# Patient Record
Sex: MALE | Race: WHITE
[De-identification: names, ages, dates, MRNs, and addresses within clinical notes are randomized per-mention and may not be internally consistent; named-entity substitution may affect disease eponyms.]

---

## 2018-01-28 ENCOUNTER — HOSPITAL ENCOUNTER (INPATIENT)
Dept: HOSPITAL 92 - ERS | Age: 48
LOS: 5 days | Discharge: HOME HEALTH | DRG: 442 | End: 2018-02-02
Attending: FAMILY MEDICINE | Admitting: FAMILY MEDICINE
Payer: COMMERCIAL

## 2018-01-28 VITALS — BODY MASS INDEX: 33.4 KG/M2

## 2018-01-28 DIAGNOSIS — K75.0: Primary | ICD-10-CM

## 2018-01-28 DIAGNOSIS — I10: ICD-10-CM

## 2018-01-28 DIAGNOSIS — N17.9: ICD-10-CM

## 2018-01-28 DIAGNOSIS — R74.0: ICD-10-CM

## 2018-01-28 DIAGNOSIS — E88.09: ICD-10-CM

## 2018-01-28 DIAGNOSIS — J10.1: ICD-10-CM

## 2018-01-28 DIAGNOSIS — E86.0: ICD-10-CM

## 2018-01-28 DIAGNOSIS — K57.20: ICD-10-CM

## 2018-01-28 LAB
ALBUMIN SERPL BCG-MCNC: 3.5 G/DL (ref 3.5–5)
ALP SERPL-CCNC: 45 U/L (ref 40–150)
ALT SERPL W P-5'-P-CCNC: 51 U/L (ref 8–55)
ANION GAP SERPL CALC-SCNC: 16 MMOL/L (ref 10–20)
AST SERPL-CCNC: 32 U/L (ref 5–34)
BASOPHILS # BLD AUTO: 0.1 THOU/UL (ref 0–0.2)
BASOPHILS NFR BLD AUTO: 0.3 % (ref 0–1)
BILIRUB SERPL-MCNC: 0.7 MG/DL (ref 0.2–1.2)
BUN SERPL-MCNC: 13 MG/DL (ref 8.9–20.6)
CALCIUM SERPL-MCNC: 9.3 MG/DL (ref 7.8–10.44)
CHLORIDE SERPL-SCNC: 104 MMOL/L (ref 98–107)
CO2 SERPL-SCNC: 21 MMOL/L (ref 22–29)
CREAT CL PREDICTED SERPL C-G-VRATE: 0 ML/MIN (ref 70–130)
CRP SERPL-MCNC: 30.21 MG/DL
CRYSTAL-AUWI FLAG: 0 (ref 0–15)
EOSINOPHIL # BLD AUTO: 0 THOU/UL (ref 0–0.7)
EOSINOPHIL NFR BLD AUTO: 0.2 % (ref 0–10)
GLOBULIN SER CALC-MCNC: 4.3 G/DL (ref 2.4–3.5)
GLUCOSE SERPL-MCNC: 122 MG/DL (ref 70–105)
HEV IGM SER QL: 1.6 (ref 0–7.99)
HGB BLD-MCNC: 12.8 G/DL (ref 14–18)
HYALINE CASTS #/AREA URNS LPF: (no result) LPF
INR PPP: 1.2
LIPASE SERPL-CCNC: 24 U/L (ref 8–78)
LYMPHOCYTES # BLD: 1.4 THOU/UL (ref 1.2–3.4)
LYMPHOCYTES NFR BLD AUTO: 6.9 % (ref 21–51)
MCH RBC QN AUTO: 31.4 PG (ref 27–31)
MCV RBC AUTO: 95.3 FL (ref 80–94)
MONOCYTES # BLD AUTO: 2.3 THOU/UL (ref 0.11–0.59)
MONOCYTES NFR BLD AUTO: 11.8 % (ref 0–10)
NEUTROPHILS # BLD AUTO: 16 THOU/UL (ref 1.4–6.5)
NEUTROPHILS NFR BLD AUTO: 80.9 % (ref 42–75)
PATHC CAST-AUWI FLAG: 0.13 (ref 0–2.49)
PLATELET # BLD AUTO: 361 THOU/UL (ref 130–400)
POTASSIUM SERPL-SCNC: 3.6 MMOL/L (ref 3.5–5.1)
PROTHROMBIN TIME: 15.1 SEC (ref 12–14.7)
RBC # BLD AUTO: 4.08 MILL/UL (ref 4.7–6.1)
RBC UR QL AUTO: (no result) HPF (ref 0–3)
SODIUM SERPL-SCNC: 137 MMOL/L (ref 136–145)
SP GR UR STRIP: (no result) (ref 1–1.04)
SPERM-AUWI FLAG: 0 (ref 0–9.9)
WBC # BLD AUTO: 19.7 THOU/UL (ref 4.8–10.8)
WBC UR QL AUTO: (no result) HPF (ref 0–3)
YEAST-AUWI FLAG: 0 (ref 0–25)

## 2018-01-28 PROCEDURE — C1751 CATH, INF, PER/CENT/MIDLINE: HCPCS

## 2018-01-28 PROCEDURE — A4216 STERILE WATER/SALINE, 10 ML: HCPCS

## 2018-01-28 PROCEDURE — 99152 MOD SED SAME PHYS/QHP 5/>YRS: CPT

## 2018-01-28 PROCEDURE — 87804 INFLUENZA ASSAY W/OPTIC: CPT

## 2018-01-28 PROCEDURE — 74177 CT ABD & PELVIS W/CONTRAST: CPT

## 2018-01-28 PROCEDURE — 77002 NEEDLE LOCALIZATION BY XRAY: CPT

## 2018-01-28 PROCEDURE — 80202 ASSAY OF VANCOMYCIN: CPT

## 2018-01-28 PROCEDURE — 83690 ASSAY OF LIPASE: CPT

## 2018-01-28 PROCEDURE — 81015 MICROSCOPIC EXAM OF URINE: CPT

## 2018-01-28 PROCEDURE — 36415 COLL VENOUS BLD VENIPUNCTURE: CPT

## 2018-01-28 PROCEDURE — 83605 ASSAY OF LACTIC ACID: CPT

## 2018-01-28 PROCEDURE — 93970 EXTREMITY STUDY: CPT

## 2018-01-28 PROCEDURE — 85610 PROTHROMBIN TIME: CPT

## 2018-01-28 PROCEDURE — 87205 SMEAR GRAM STAIN: CPT

## 2018-01-28 PROCEDURE — 87040 BLOOD CULTURE FOR BACTERIA: CPT

## 2018-01-28 PROCEDURE — 82105 ALPHA-FETOPROTEIN SERUM: CPT

## 2018-01-28 PROCEDURE — 71046 X-RAY EXAM CHEST 2 VIEWS: CPT

## 2018-01-28 PROCEDURE — 81003 URINALYSIS AUTO W/O SCOPE: CPT

## 2018-01-28 PROCEDURE — 96365 THER/PROPH/DIAG IV INF INIT: CPT

## 2018-01-28 PROCEDURE — C1729 CATH, DRAINAGE: HCPCS

## 2018-01-28 PROCEDURE — 36569 INSJ PICC 5 YR+ W/O IMAGING: CPT

## 2018-01-28 PROCEDURE — 80053 COMPREHEN METABOLIC PANEL: CPT

## 2018-01-28 PROCEDURE — 82378 CARCINOEMBRYONIC ANTIGEN: CPT

## 2018-01-28 PROCEDURE — 85025 COMPLETE CBC W/AUTO DIFF WBC: CPT

## 2018-01-28 PROCEDURE — 87633 RESP VIRUS 12-25 TARGETS: CPT

## 2018-01-28 PROCEDURE — 87070 CULTURE OTHR SPECIMN AEROBIC: CPT

## 2018-01-28 PROCEDURE — 86140 C-REACTIVE PROTEIN: CPT

## 2018-01-28 PROCEDURE — 96361 HYDRATE IV INFUSION ADD-ON: CPT

## 2018-01-28 PROCEDURE — 86301 IMMUNOASSAY TUMOR CA 19-9: CPT

## 2018-01-28 PROCEDURE — 47010 HEPATOT OPN DRG ABSC/CST 1/2: CPT

## 2018-01-28 PROCEDURE — 99153 MOD SED SAME PHYS/QHP EA: CPT

## 2018-01-28 RX ADMIN — METRONIDAZOLE SCH MLS: 500 INJECTION, SOLUTION INTRAVENOUS at 18:24

## 2018-01-28 RX ADMIN — POTASSIUM CHLORIDE, DEXTROSE MONOHYDRATE AND SODIUM CHLORIDE SCH MLS: 150; 5; 450 INJECTION, SOLUTION INTRAVENOUS at 18:23

## 2018-01-28 NOTE — CT
CT ABDOMEN AND PELVIS WITH CONTRAST:

 

Date:  01/28/18 

 

HISTORY:  

Abdominal pain. 

 

COMPARISON:  

None. 

 

FINDINGS:

The lung bases are clear. No pericardial effusion. 

 

There is thickening of the sigmoid colon with some mild inflammation around the diverticulum. Within 
the left lobe of the liver, abutting the capsule, is a heterogeneous peripherally enhancing mass estela
uring 7.2 x 5.9 x 5.5 cm. There are multiple central hypodensities within this mass, which is irregul
ar heterogeneous. There is transhepatic attenuation difference demarcated with increased attenuation 
of the left lobe of the liver relative to the right. There are multiple smaller hypodense lesions in 
the right lobe of the liver, hepatic segment 7, measuring 1.2 and 0.7 cm. 

 

Small retroperitoneal lymph nodes. The spleen and pancreas are unremarkable. The hepatic mass displac
es the middle and left hepatic veins and is interposed between the two. 

 

Skeleton unremarkable. 

 

IMPRESSION: 

1.  Heterogeneous hypodense mass in hepatic segment 2, abutting the capsule, interposed between the l
eft and middle hepatic veins, measuring 7.2 x 5.9 x 5.5 cm. This has the appearance of hepatic absces
s. There are multiple smaller hypodense lesions within hepatic segment 8, as well as segment 7. There
 may be some other smaller abscesses. Metastatic disease is also a possibility. There is also a separ
ate low attenuation mass within segment 4b measuring 2.3 cm. 

 

2.  Thickening of the sigmoid colon with inflammation around diverticulum, mild. This may represent r
esolving diverticulitis. A colonoscopic evaluation will be necessary after resolution of symptomatolo
gy to evaluate for underlying mass. 

 

 

 

POS: PRABHA

## 2018-01-28 NOTE — HP
PRIMARY CARE PHYSICIAN:  Dr. Refugio Marie.

 

CHIEF COMPLAINT:  Fevers and body aches.

 

HISTORY OF PRESENT ILLNESS:  The patient established with me in the clinic on 01/10/2018 with flu-lik
e symptoms.  Flu swab and strep throat were negative; however, patient did have otitis media present,
 was treated with cefdinir with interval clearing of symptoms and fevers.  Upon cessation of antibiot
ics, generalized malaise and fevers returned, presented back to clinic.  Flu swab repeat was negative
 as well.  The patient instructed to call back if fevers continued and he did so last week when he no
ticed a stool consistency and changed to looser with mucus.  The patient with a known history of dive
rticulitis in the past; however, the patient has not had any abdomen pain in the last 2-3 weeks or st
ool changes prior to several days ago; started on ciprofloxacin and metronidazole over the last 48 ho
urs; however, his fevers continued and he presented to the Emergency Department for evaluation and fo
und to have a liver abscess on CT scan.  On review of records in our clinic systems, the patient was 
seen in 2008 for abdomen pain at the [facility], established with Dr. Krause tem
porarily, was sent to Dr. Perdomo of Gastroenterology who recommended colonoscopy; however, no records 
of colonoscopy performed.  The patient states his last colonoscopy was 2 years ago with [facility] physicians after he changed insurances.  CT scan of 2008 [facility] reviewed w
ithout abnormality and no diverticulitis.  Liver was normal.

 

On formal review of systems, fevers and chills, positive malaise, body aches, changes in stool consis
tency.  No nausea, vomiting.  No constipation.  No abdomen pain.  Positive for cough and congestion. 
 No lower extremity edema.  No palpitations, chest pain.  No rashes.

 

On review of the past family, medical and surgical history includes colonoscopy 2 years ago prior chelsea
veda for injury.

 

ALLERGIES:  No known drug allergies.

 

SOCIAL HISTORY:  Lives with spouse and with children.  No significant tobacco or alcohol use reported
.

 

PHYSICAL EXAMINATION:

VITAL SIGNS:  Review on arrival to the floor, temperature of 98.1, pulse of 91, respiratory rate of 1
6, oxygen saturation 98% on room air and blood pressure 146/78.

GENERAL:  The patient is alert and oriented, in no acute distress.

HEENT:  Normocephalic and atraumatic.  Extraocular movements are intact.  Sclerae are clear.  Oral mu
[place]a is dry.

NECK:  Supple.

HEART:  Regular rate and rhythm.  No murmurs auscultated.

LUNGS:  Clear to auscultation bilaterally.  No rubs or wheezes.

ABDOMEN:  Protuberant, resonant to percussion, nontender.  Kincaid's sign negative.  No rebound.  Nega
tive fluid wave.  No CVA tenderness.

EXTREMITIES:  No lower extremity edema.

NEUROLOGIC:  The patient is alert and oriented x3, no focal deficits.  Speech is normal.

 

REVIEW OF LABORATORY WORK:  White blood cell count of 19.7, hemoglobin of 12.8, platelet count of 361
, neutrophils percentage 80.9 with 16% bands.  INR of 1.2.  Lactic acid of 4.3, CO2 21.  Sodium 137, 
potassium of 3.6, creatinine of 1.1, glucose of 122, calcium of 9.3, AST of 32, ALT of 51, CRP of 30,
 alkaline phosphatase of 45, lipase of 24.  Albumin 3.5.  Urinalysis clear, large blood with too nume
garry to count rbc's microscopy.  No white blood cells, no leukocyte esterase.  Specific gravity of 1.
06.

 

IMAGING DATA:  Abdomen CT reviewed heterogeneous hypodense mass and hepatic segment to measuring 7.2 
x 5.9 x 5.5 cm consistent with hepatic abscess, multiple small hypodense lesions scattered throughout
 segment 87 taking the sigmoid colon consistent with diverticulum and inflamed recommending a colonos
copy to rule out any underlying masses per Radiology.

 

ASSESSMENT:  A liver abscess diverticulitis, dehydration.

 

PLAN:  Emergency Department contacted Surgery for recommendations if patient was a candidate for any 
acute interventions.  They recommended a CT liver abscess drained per Interventional Radiology, which
 is ordered and pending patient's n.p.o. at midnight.  

Consulting Gastroenterology for secondary opinion; start patient on broad-spectrum IV antibiotics, va
ncomycin and Zosyn, which was started in the Emergency Department for continuation of metronidazole, 
would expect any fluid cultures, fluid produced from liver to be cultured and will adjust antibiotics
 as needed.  Dehydration, start the patient on fluid bolus.  We will start with additional IV fluids 
while patient is n.p.o. overnight for vital signs.  The patient is not currently septic.  We will fol
low the patient's fever profile, vital signs, cell counts and liver enzymes while inpatient.

## 2018-01-28 NOTE — CON
DATE FOR CONSULTATION:  01/28/2018

 

REQUESTING PHYSICIAN:  Dr. Marie.

 

REASON FOR CONSULTATION:  Liver lesion likely abscess

 

HISTORY OF PRESENT ILLNESS:  Nasir Mckeon is a very pleasant 47-year-old man who has a past medical
 history of diverticulitis attacks, a couple of mild attacks since 2015.  He says his last colonoscop
y was a couple of years ago at [facility] with the only finding being diverticular disease.  No 
polyps removed.  I do have that report.  He had previously seen my GI colleague, Dr. Freeman Perdomo ab
out 9 years ago for some abdominal pain symptoms at that time, but had not followed up with Dr. Perdomo
 since then.  At any rate, Mr. Mckeon really has no chronic gastrointestinal symptoms.  About 3 wee
ks ago, he started having malaise and flu-like symptoms.  He was treated for otitis media and felt be
tter for a while, but now for the past week, he has had recurrent fevers which have been quite signif
icant despite alternating Tylenol and ibuprofen at home.  Interestingly, he reports no abdominal pain
 throughout all of this and really no change in his normal bowel habits, certainly no hematochezia or
 diarrhea.  His weight has been stable, but his appetite has been declining significantly.  He underw
ent a CT scan of the abdomen and pelvis earlier today and this demonstrated a 7.2 x 5.9 x 5.5 cm hete
rogeneous density in the left hepatic lobe and multiple smaller lesions in the right lobe, most likel
y consistent with abscess versus less likely metastatic disease.  There is also an area of the sigmoi
d colon with stranding consistent with diverticulitis.  He has a leukocytosis and elevated CRP, note 
LFTs are normal.  He is febrile, but hemodynamically stable.  He was started on IV vancomycin, Zosyn 
and metronidazole.  CT guided abscess drainage is planned for tomorrow with Interventional Radiology.
  Blood cultures are pending.  Note the patient has no underlying history of chronic liver disease.  
Alcohol use is rare.  He does not use injection drugs.

 

REVIEW OF SYSTEMS:  Full review of systems including constitutional, head, eyes, ears, nose, throat, 
GI, , cardiovascular, respiratory, musculoskeletal, and neurologic systems is negative except as no
geneva in the HPI.

 

PAST MEDICAL HISTORY:  Diverticulitis in 2015.  Colonoscopy in 2015, showing diverticulosis.

 

ALLERGIES:  No known drug allergies.

 

OUTPATIENT MEDICATIONS:  None.

 

INPATIENT MEDICATIONS:  IV vancomycin, IV Zosyn, IV metronidazole.

 

SOCIAL HISTORY:  No smoking or alcohol use.

 

FAMILY HISTORY:  Negative for GI or liver disease.

 

PHYSICAL EXAMINATION:

VITAL SIGNS:  Temperature 100.6, pulse 91, blood pressure 146/78, 98% oxygen saturation on room air.

GENERAL:  A 47-year-old  man, lying in bed comfortably, in no distress.

SKIN:  No rash.  No jaundice.

EYES:  No scleral icterus.  Extraocular movements intact.

ENT:  Mucous membranes moist, no oral lesions.

LYMPH:  No submandibular, supraclavicular lymphadenopathy.

THYROID:  Nontender to palpation.

HEART:  Regular rate and rhythm.

LUNGS:  Clear to auscultation bilaterally.

ABDOMEN:  Bowel sounds are present, soft, mild tenderness to deep palpation in the lower abdomen, oth
erwise no tenderness to the abdomen.  No guarding or rebound tenderness.  No palpable organomegaly ap
preciated.

EXTREMITIES:  No peripheral edema.

VESSELS:  Radial pulses 2+ bilaterally.

NEUROLOGICAL:  Cranial nerves II-XII intact bilaterally.  No focal deficits.

 

LABORATORY STUDIES:  WBC elevated at 19.7, CRP elevated at 30.21, hemoglobin 12.8, platelets 361.  So
dium 137, potassium 3.6, BUN 13, creatinine 1.11, glucose 122, lipase 24.  LFTs all normal with total
 bilirubin 0.7, alkaline phosphatase 45, AST 32, ALT 51, albumin 3.5.  Blood cultures are pending.

 

IMAGING STUDIES:  CT of the abdomen and pelvis demonstrates sigmoid diverticulitis and a 7.2 x 5.9 x 
5.5 cm left hepatic lobe density which is heterogenous and likely represent an abscess.  There are mu
ltiple smaller lesions in the right hepatic lobe, possibly representing smaller abscesses versus less
 likely metastatic disease.

 

ASSESSMENT AND PLAN:

1.  Liver abscess.

2.  Diverticulitis, sigmoid colon, minimally symptomatic.

 

I have reviewed the films as well as the CT report.  Overall, I agree that this likely does represent
s a liver abscess rather than malignancy.  It is unclear, if this is related to his diverticulitis, w
adriano is actually very minimally symptomatic.  We will order tumor markers including AFP, CA-19-9 and 
CEA to be drawn with his morning labs, but I expect those will be within normal limits.  I do agree w
ith the plan already in place for Interventional Radiology to aspirate and likely insert a drain into
 this abscess cavity tomorrow.  Follow up all cultures including aspirate cultures and blood cultures
.  Continue with the antibiotics for now.  I do not recommend any colonoscopy at this time acutely, t
landry he will need a followup colon exam several weeks after resolution.

 

Dr. Perdomo is back tomorrow.

 

Thank you for the consultation.  Please call back with questions or concerns.

## 2018-01-29 LAB
ALBUMIN SERPL BCG-MCNC: 2.9 G/DL (ref 3.5–5)
ALP SERPL-CCNC: 44 U/L (ref 40–150)
ALT SERPL W P-5'-P-CCNC: 46 U/L (ref 8–55)
ANION GAP SERPL CALC-SCNC: 11 MMOL/L (ref 10–20)
AST SERPL-CCNC: 29 U/L (ref 5–34)
BASOPHILS # BLD AUTO: 0 THOU/UL (ref 0–0.2)
BASOPHILS NFR BLD AUTO: 0 % (ref 0–1)
BILIRUB SERPL-MCNC: 0.5 MG/DL (ref 0.2–1.2)
BUN SERPL-MCNC: 9 MG/DL (ref 8.9–20.6)
CALCIUM SERPL-MCNC: 8.3 MG/DL (ref 7.8–10.44)
CHLORIDE SERPL-SCNC: 107 MMOL/L (ref 98–107)
CO2 SERPL-SCNC: 25 MMOL/L (ref 22–29)
CREAT CL PREDICTED SERPL C-G-VRATE: 139 ML/MIN (ref 70–130)
EOSINOPHIL # BLD AUTO: 0.2 THOU/UL (ref 0–0.7)
EOSINOPHIL NFR BLD AUTO: 1.2 % (ref 0–10)
GLOBULIN SER CALC-MCNC: 3.5 G/DL (ref 2.4–3.5)
GLUCOSE SERPL-MCNC: 110 MG/DL (ref 70–105)
HGB BLD-MCNC: 11.4 G/DL (ref 14–18)
LIPASE SERPL-CCNC: 18 U/L (ref 8–78)
LYMPHOCYTES # BLD: 1.1 THOU/UL (ref 1.2–3.4)
LYMPHOCYTES NFR BLD AUTO: 8.2 % (ref 21–51)
MCH RBC QN AUTO: 31.5 PG (ref 27–31)
MCV RBC AUTO: 97.7 FL (ref 80–94)
MONOCYTES # BLD AUTO: 1.6 THOU/UL (ref 0.11–0.59)
MONOCYTES NFR BLD AUTO: 12.8 % (ref 0–10)
NEUTROPHILS # BLD AUTO: 10 THOU/UL (ref 1.4–6.5)
NEUTROPHILS NFR BLD AUTO: 77.8 % (ref 42–75)
PLATELET # BLD AUTO: 290 THOU/UL (ref 130–400)
POTASSIUM SERPL-SCNC: 3.9 MMOL/L (ref 3.5–5.1)
RBC # BLD AUTO: 3.63 MILL/UL (ref 4.7–6.1)
SODIUM SERPL-SCNC: 139 MMOL/L (ref 136–145)
WBC # BLD AUTO: 12.8 THOU/UL (ref 4.8–10.8)

## 2018-01-29 PROCEDURE — 0F903ZX DRAINAGE OF LIVER, PERCUTANEOUS APPROACH, DIAGNOSTIC: ICD-10-PCS | Performed by: RADIOLOGY

## 2018-01-29 RX ADMIN — POTASSIUM CHLORIDE, DEXTROSE MONOHYDRATE AND SODIUM CHLORIDE SCH MLS: 150; 5; 450 INJECTION, SOLUTION INTRAVENOUS at 00:28

## 2018-01-29 RX ADMIN — METRONIDAZOLE SCH MLS: 500 INJECTION, SOLUTION INTRAVENOUS at 15:00

## 2018-01-29 RX ADMIN — POTASSIUM CHLORIDE, DEXTROSE MONOHYDRATE AND SODIUM CHLORIDE SCH MLS: 150; 5; 450 INJECTION, SOLUTION INTRAVENOUS at 18:32

## 2018-01-29 RX ADMIN — POTASSIUM CHLORIDE, DEXTROSE MONOHYDRATE AND SODIUM CHLORIDE SCH MLS: 150; 5; 450 INJECTION, SOLUTION INTRAVENOUS at 15:56

## 2018-01-29 RX ADMIN — VANCOMYCIN HYDROCHLORIDE SCH MLS: 1 INJECTION, POWDER, LYOPHILIZED, FOR SOLUTION INTRAVENOUS at 02:16

## 2018-01-29 RX ADMIN — GUAIFENESIN AND DEXTROMETHORPHAN PRN ML: 100; 10 SYRUP ORAL at 18:31

## 2018-01-29 RX ADMIN — POTASSIUM CHLORIDE, DEXTROSE MONOHYDRATE AND SODIUM CHLORIDE SCH: 150; 5; 450 INJECTION, SOLUTION INTRAVENOUS at 12:39

## 2018-01-29 RX ADMIN — VANCOMYCIN HYDROCHLORIDE SCH MLS: 1 INJECTION, POWDER, LYOPHILIZED, FOR SOLUTION INTRAVENOUS at 15:56

## 2018-01-29 RX ADMIN — METRONIDAZOLE SCH MLS: 500 INJECTION, SOLUTION INTRAVENOUS at 02:16

## 2018-01-29 NOTE — CT
CT GUIDED PERCUTANEOUS DRAINAGE OF THE LIVER:

 

Date:  01/29/18 

 

CLINICAL HISTORY:  

Hepatic abscess. 

 

PROCEDURE:

Informed consent was obtained. The patient was escorted the procedural suite and  imaging of the
 abdomen was acquired. The lesion of interest was located. The patient's ventral abdomen was then mar
ked, and prepped and draped in the standard sterile fashion. Conscious sedation was provided by the 
adiology nurse and the patient was monitored throughout the procedure in stable condition. Topical an
esthesia was achieved with 1% lidocaine. Using a percutaneous 13.8 cm needle, the lesion of interest 
was approached. The needle did reach the periphery/anterior edge of the lesion. A guidewire was advan
marianna after inner stylette of the needle was removed. The guidewire did not freely coil within the pock
et. Various attempts to advance the guidewire did not result in coiling of the wire within the cavity
. Therefore, the guidewire was removed, and attempt for aspiration was performed. Two separate aspira
tions were then performed with the 13.8 cm needle, longest needle available. Scan, sanguinous aspirat
e was achieved into two separate sterile containers which were sent to laboratory for analysis. 

 

All devices were then removed from the patient. The patient tolerated the procedure well and without 
evidence of complication. 

 

IMPRESSION: 

CT guided percutaneous aspiration of hepatic abscess, as above. The deep, central location of the les
ion was beyond the accessible length of the longest available drainage kit, as discussed above. 

 

Procedural results called to patient's physician, Refugio Marie, at the time of dictation. 

 

CODE CR. 

 

 

 

POS: DENISSE

## 2018-01-29 NOTE — PRG
DATE OF SERVICE:  01/29/2018

 

SUBJECTIVE:  He underwent CT guided drainage of the liver abscess today.  The needle aspiration of th
e abscess was performed from the edge of the abscess; however, the guidewire could not be placed deep
ly into the abscess and a drain was not able to be placed.  The patient has no acute complaints at th
is time, otherwise.

 

OBJECTIVE:

VITAL SIGNS:  Temperature 98.4, pulse 77, and blood pressure 160/74.

GENERAL:  He is in no acute distress.  He is awake and alert.

CHEST:  Lungs are clear to auscultation bilaterally.

ABDOMEN:  Soft, nontender, and nondistended.

 

LABORATORY DATA:  White blood cell count 12.8, hemoglobin 11.4, and platelets 290.

 

IMPRESSION:  Liver abscess apparently from diverticulitis.  He had a colonoscopy 2 years ago at [facility] by Dr. Chisholm, which was otherwise negative.

 

RECOMMENDATIONS:

1.  Plan is for PICC line placement and IV antibiotics per Dr. Loaiza.

2.  Followup imaging and duration of antibiotics again will be determined by Dr. Loaiza.

3.  We will advance his diet.

4.  Follow up in GI clinic in 6 weeks.

5.  I will sign off for now.  Please call if GI can be of assistance.

## 2018-01-29 NOTE — CON
DATE OF CONSULTATION:  01/29/2018

 

REASON FOR CONSULTATION:  Liver abscess, diverticulitis.

 

HISTORY OF PRESENT ILLNESS:  A 47-year-old with history of diverticulitis, treated with oral antimicr
obials in the past, who, a few days ago, had diagnosed otitis media, treated with cefdinir with impro
vement, but then recrudescence of malaise and fevers with what he describes as a sensation of burning
 or heat in the left side of the abdomen.  He also noticed somewhat looser stool with mucus.  He then
 had a recrudescence of fever and presented to the emergency room, and a CT scan demonstrated a liver
 abscess and so he is admitted now for management.  The patient is heading for percutaneous aspirate,
 CT-guided.  Awake and alert.  No headaches, visual symptoms, sore throat, odynophagia, dysphagia, no
 toothache or back pain, no neck pain, no dyspnea, cough or sputum production.  No genitourinary symp
toms or joint symptoms.  No neurological symptoms.

 

PAST MEDICAL HISTORY:  Diverticulitis, treated conservatively in the past.  Last CT scan in 2008 did 
not show any inflammatory changes reportedly.

 

ALLERGIES:  None.

 

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

 

CURRENT MEDICATIONS:  Tylenol, Tessalon, Benadryl, Pepcid, Flagyl, Zosyn, vancomycin.

 

PHYSICAL EXAMINATION:

VITAL SIGNS:  T-max 101.8, blood pressure 140/70, pulse 83, respirations 20, O2 sat 95%.

GENERAL:  Appears in no distress.

SKIN EXAM:  Normal.  Peripheral IV access.  No Fajardo catheter.  No lymphadenopathy.

HEENT:  Ocular movements are conjugate.  Sclerae white.  Oral cavity normal.

NECK:  Supple.

LUNGS:  With symmetric clear breath sounds.

HEART:  S1 and S2, regular rate.  No tenderness.  No ascites.  No bladder distention.

:  No genital abnormalities.

EXTREMITIES:  Pulses are excellent in lower extremities.

NEUROLOGIC EXAMINATION:  Nonfocal.  Cognitive function normal.

 

LABORATORY DATA:  White cell count 19,000 down to 12.8, hemoglobin 11, platelets 290 with 77% neutrop
hils.  Sodium 139, creatinine 1.0.  Albumin down from 3.5 to 2.9.  Lipase 18.  Carcinoembryonic antig
en 0.74, tumor marker AFP was less than 2.  Urinalysis with 0-3 wbc's.  Two sets of blood cultures pe
nding at this time.  Abdomen and pelvis CT from yesterday, heterogeneous hypodense mass, hepatic segm
ent to abutting the capsule, which has the appearance of a hepatic abscess.  There are multiple small
er hypodense lesions also seen elsewhere, thickening of the sigmoid colon with inflammatory changes a
round diverticulum, mild.

 

ASSESSMENT:  Diverticulitis, now with a liver abscess, which probably is a consequence of spread from
 the venous system and portal vein towards the liver.  The usual pathogens including gram-negative ro
ds, anaerobes, Streptococci are the main considerations.  We will continue the current regimen and wa
it for the aspirate with cultures, PICC line placement, and protracted IV antimicrobial therapy admin
istration.  The patient probably will have a drain placed and we will keep the drain in until there i
s significant decrease in size of the abscess, most likely to be removed in the outpatient setting.

## 2018-01-29 NOTE — PRG
DATE OF SERVICE:  01/29/2018

 

HISTORY OF PRESENT ILLNESS:  The patient reports he continues to have cough, has only been going on f
or 2 to 3 days now.  The patient's daughter was diagnosed with flu in clinic today.  Flu swab of the 
patient was resulted as negative.  The patient continues to have intermittent fevers treated with Tyl
enol and states he is still feeling fatigued, slightly worse with initiation of the antibiotics in pr
evious outpatient.  He states he has good urination without difficulty on IV fluids.  When for liver 
drainage abscess, he had a CT with central location of abscess, radiology unable to aspirate drain si
te, aspiration was attempted at LINQ apparatus and sent for culture.

 

OBJECTIVE:

VITAL SIGNS:  Temperature of 100.3, pulse of 77, respiratory rate of 20, oxygen saturation of 99% on 
room air, and blood pressure 160/94.

GENERAL:  The patient is alert and oriented, in no acute distress.

HEENT:  Head is normocephalic, atraumatic.  Extraocular movements intact.  Sclerae are clear.  Oral m
ucosa is moist.

NECK:  Supple.

HEART:  Regular rate and rhythm.  No murmurs auscultated at the time of exam.

LUNGS:  Clear to auscultation bilaterally.

ABDOMEN:  Soft, nontender, positive bowel sounds throughout.

EXTREMITIES:  Lower extremities without cyanosis or edema.

NEUROLOGIC:  The patient is alert and oriented x3, no focal deficits.

 

LABORATORY DATA:  White blood cell count improved to 12.8, hemoglobin of 11.4, platelets of 290, neut
rophils slightly improved to 77.8.  Tumor marker AFP less than 2, CEA of 0.74, lipase 18, albumin of 
3.5, AST of 29, ALT of 46, glucose of 110, creatinine of 1.0.  Potassium of 3.9, sodium of 139, and c
hloride of 107.

 

ASSESSMENT AND PLAN:  Liver abscess, diverticulitis, gastroesophageal reflux disease, cough.  Continu
ing cough coverage.  No influenza as above.  Lungs are clear.  Infectious Disease consulted, recommen
ding PICC line for prolonged IV antibiotics.  We will follow their recommendations.  Follow up michael truong is pending.  The patient reports he is stable on Pepcid at this point in time regarding gastroesoph
ageal reflux disease.  P.r.n. cough medicines ordered.

## 2018-01-30 LAB
ALBUMIN SERPL BCG-MCNC: 2.7 G/DL (ref 3.5–5)
ALP SERPL-CCNC: 39 U/L (ref 40–150)
ALT SERPL W P-5'-P-CCNC: 39 U/L (ref 8–55)
ANION GAP SERPL CALC-SCNC: 10 MMOL/L (ref 10–20)
AST SERPL-CCNC: 24 U/L (ref 5–34)
BASOPHILS # BLD AUTO: 0 THOU/UL (ref 0–0.2)
BASOPHILS NFR BLD AUTO: 0.1 % (ref 0–1)
BILIRUB SERPL-MCNC: 0.5 MG/DL (ref 0.2–1.2)
BUN SERPL-MCNC: 6 MG/DL (ref 8.9–20.6)
CALCIUM SERPL-MCNC: 8.1 MG/DL (ref 7.8–10.44)
CHLORIDE SERPL-SCNC: 106 MMOL/L (ref 98–107)
CO2 SERPL-SCNC: 23 MMOL/L (ref 22–29)
CREAT CL PREDICTED SERPL C-G-VRATE: 152 ML/MIN (ref 70–130)
EOSINOPHIL # BLD AUTO: 0.2 THOU/UL (ref 0–0.7)
EOSINOPHIL NFR BLD AUTO: 1.5 % (ref 0–10)
GLOBULIN SER CALC-MCNC: 3.3 G/DL (ref 2.4–3.5)
GLUCOSE SERPL-MCNC: 110 MG/DL (ref 70–105)
HGB BLD-MCNC: 10.6 G/DL (ref 14–18)
LIPASE SERPL-CCNC: 18 U/L (ref 8–78)
LYMPHOCYTES # BLD: 1.4 THOU/UL (ref 1.2–3.4)
LYMPHOCYTES NFR BLD AUTO: 10.8 % (ref 21–51)
MCH RBC QN AUTO: 31.6 PG (ref 27–31)
MCV RBC AUTO: 96.6 FL (ref 80–94)
MONOCYTES # BLD AUTO: 1.4 THOU/UL (ref 0.11–0.59)
MONOCYTES NFR BLD AUTO: 10.7 % (ref 0–10)
NEUTROPHILS # BLD AUTO: 9.7 THOU/UL (ref 1.4–6.5)
NEUTROPHILS NFR BLD AUTO: 76.9 % (ref 42–75)
PLATELET # BLD AUTO: 278 THOU/UL (ref 130–400)
POTASSIUM SERPL-SCNC: 3.8 MMOL/L (ref 3.5–5.1)
RBC # BLD AUTO: 3.35 MILL/UL (ref 4.7–6.1)
SODIUM SERPL-SCNC: 135 MMOL/L (ref 136–145)
VANCOMYCIN TROUGH SERPL-MCNC: 10.9 UG/ML
WBC # BLD AUTO: 12.6 THOU/UL (ref 4.8–10.8)

## 2018-01-30 RX ADMIN — POTASSIUM CHLORIDE, DEXTROSE MONOHYDRATE AND SODIUM CHLORIDE SCH MLS: 150; 5; 450 INJECTION, SOLUTION INTRAVENOUS at 01:49

## 2018-01-30 RX ADMIN — MEROPENEM SCH MLS: 1 INJECTION, POWDER, FOR SOLUTION INTRAVENOUS at 21:41

## 2018-01-30 RX ADMIN — GUAIFENESIN AND DEXTROMETHORPHAN PRN ML: 100; 10 SYRUP ORAL at 17:47

## 2018-01-30 RX ADMIN — METRONIDAZOLE SCH MLS: 500 INJECTION, SOLUTION INTRAVENOUS at 14:12

## 2018-01-30 RX ADMIN — Medication PRN ML: at 21:41

## 2018-01-30 RX ADMIN — GUAIFENESIN AND DEXTROMETHORPHAN PRN ML: 100; 10 SYRUP ORAL at 08:40

## 2018-01-30 RX ADMIN — METRONIDAZOLE SCH MLS: 500 INJECTION, SOLUTION INTRAVENOUS at 01:49

## 2018-01-30 RX ADMIN — GUAIFENESIN AND DEXTROMETHORPHAN PRN ML: 100; 10 SYRUP ORAL at 00:07

## 2018-01-30 NOTE — PRG
DATE OF SERVICE:  01/30/2018

 

SUBJECTIVE:  The liver abscess percutaneous aspirate procedure was not successful or at least partial
ly successful.  The area of the abscess was beyond the reach of the catheter and the guidewire could 
not be threaded all the way to the abscess cavity.  A little bit of serosanguineous fluid was aspirat
ed and submitted for cultures.  The patient has developed a cough, which is short of paroxysmal, last
s for a few minutes and then resolves.  No headaches.  No abdominal pain.  No diarrhea.  No genitouri
nary symptoms.

 

OBJECTIVE:

VITAL SIGNS:  T-max 101.6, blood pressure 160/82, pulse 81, respirations 18-20.

GENERAL:  Awake and alert.  Coughing intermittently.

LUNGS:  With clear breath sounds.

HEART:  S1, S2, regular rate.

ABDOMEN:  Soft, not distended or tender.

 

LABORATORY:  White cell count 12.6, hemoglobin 10.6, platelets 278.  Sodium 135, creatinine 0.92.  Li
haven profile normal.  Bacterial culture pending.  Blood cultures negative thus far.

 

ASSESSMENT:  Liver abscess, diverticulitis, now with cough.  The patient does have evidence of poster
ior nasal drip to this could be related to chronic rhinosinusitis.  We will switch him to meropenem f
rom the current antimicrobials.  The usual pathogens involved in the liver abscess should respond wel
l to meropenem.  The amoebic liver abscess is unlikely.  PICC line placement is not ordered yet.

## 2018-01-30 NOTE — PRG
DATE OF SERVICE:  01/30/2018

 

HISTORY OF PRESENT ILLNESS:  The patient states he broke into a cold sweat this morning which is the 
first time he feels like his fever has truly broken since it occurred.  He continued on Tylenol episo
dically for this.  He still reports fatigue.  Still reports cough, no phlegm production.  No abdomen 
pain.  He is still urinating and having bowel movements without difficulty.

 

VITAL SIGNS:  Temperature of 100.3 yesterday evening, a.m. temperature of 98.7, pulse of 68, respirat
ory rate of 18, oxygen saturation 100% on room air, blood pressure 152/90.

 

LABORATORY DATA:  White blood cell count improved to 12.6, hemoglobin at 10.6, status post attempted 
liver aspiration, platelet count 278, % neutrophils 76.9, slightly improved, albumin of 2.7.  Lipase 
of 18, AST of 24, ALT of 39,  total bilirubin 0.5, glucose 110, creatinine 0.9, CO2 23, sodium 135, p
otassium 3.8, vancomycin trough of 10.9.  Blood cultures x2 currently negative.  Influenza negative. 
  Gram stain, no organisms on attempted liver aspirate.

 

PHYSICAL EXAMINATION: 

GENERAL:  The patient is alert and oriented, in no acute distress.

HEENT:  Normocephalic, atraumatic.  Extraocular movements are intact.  Sclerae are clear.  Oral mucos
a is moist.

NECK:  Supple.

HEART:  Regular rate and rhythm at time of exam.  No murmurs auscultated.

LUNGS:  Clear to auscultation bilaterally.

ABDOMEN:  Soft, nontender, positive bowel sounds throughout.  No rebound or guarding.

EXTREMITIES:  Lower extremities without cyanosis or edema.

NEUROLOGIC:  The patient is alert and oriented x3, no focal deficits.  Speech is normal.

 

ASSESSMENT AND PLAN:  Liver abscess, diverticulitis.   Gastroenterology signed off, recommended shaun
nuation of plan for PICC line and long-term IV antibiotics.  We will follow up on cultures and Infect
ious Disease recommendations for outpatient antibiotics.  The patient will likely remain inpatient fo
r the next couple of days while cultures are being read out why he continues to have fevers.  We will
 continue the Tylenol, will likely cover with low dose amlodipine regarding the patient's blood press
ure elevation and hypertension, it may be stress induced reaction, but we will continue to follow.

## 2018-01-30 NOTE — RAD
PA AND LATERAL CHEST:

1/30/18

 

HISTORY: 

Cough.

 

Heart size appears slightly enlarged. Mediastinal structures are unremarkable. The lungs are clear of
 infiltrates.

 

IMPRESSION:  

Minimal cardiomegaly. 

 

POS: [facility]

## 2018-01-31 LAB
ALBUMIN SERPL BCG-MCNC: 2.9 G/DL (ref 3.5–5)
ALP SERPL-CCNC: 38 U/L (ref 40–150)
ALT SERPL W P-5'-P-CCNC: 48 U/L (ref 8–55)
ANION GAP SERPL CALC-SCNC: 12 MMOL/L (ref 10–20)
AST SERPL-CCNC: 37 U/L (ref 5–34)
BILIRUB SERPL-MCNC: 0.3 MG/DL (ref 0.2–1.2)
BUN SERPL-MCNC: 8 MG/DL (ref 8.9–20.6)
CALCIUM SERPL-MCNC: 8.5 MG/DL (ref 7.8–10.44)
CHLORIDE SERPL-SCNC: 106 MMOL/L (ref 98–107)
CO2 SERPL-SCNC: 24 MMOL/L (ref 22–29)
CREAT CL PREDICTED SERPL C-G-VRATE: 120 ML/MIN (ref 70–130)
GLOBULIN SER CALC-MCNC: 3.4 G/DL (ref 2.4–3.5)
GLUCOSE SERPL-MCNC: 102 MG/DL (ref 70–105)
HGB BLD-MCNC: 11 G/DL (ref 14–18)
MACROCYTES BLD QL SMEAR: (no result) (100X)
MCH RBC QN AUTO: 31.8 PG (ref 27–31)
MCV RBC AUTO: 96.4 FL (ref 80–94)
MDIFF COMPLETE?: YES
PLATELET # BLD AUTO: 260 THOU/UL (ref 130–400)
PLATELET BLD QL SMEAR: (no result)
POLYCHROMASIA BLD QL SMEAR: (no result) (100X)
POTASSIUM SERPL-SCNC: 3.7 MMOL/L (ref 3.5–5.1)
RBC # BLD AUTO: 3.47 MILL/UL (ref 4.7–6.1)
SODIUM SERPL-SCNC: 138 MMOL/L (ref 136–145)
WBC # BLD AUTO: 8.2 THOU/UL (ref 4.8–10.8)

## 2018-01-31 PROCEDURE — 02H633Z INSERTION OF INFUSION DEVICE INTO RIGHT ATRIUM, PERCUTANEOUS APPROACH: ICD-10-PCS | Performed by: RADIOLOGY

## 2018-01-31 RX ADMIN — MEROPENEM SCH MLS: 1 INJECTION, POWDER, FOR SOLUTION INTRAVENOUS at 20:03

## 2018-01-31 RX ADMIN — MEROPENEM SCH MLS: 1 INJECTION, POWDER, FOR SOLUTION INTRAVENOUS at 13:16

## 2018-01-31 RX ADMIN — MEROPENEM SCH MLS: 1 INJECTION, POWDER, FOR SOLUTION INTRAVENOUS at 04:48

## 2018-01-31 NOTE — PRG
DATE OF SERVICE:  01/31/2018

 

HISTORY OF PRESENT ILLNESS:  This patient states that he has had the best night sleep since arrival, 
continues to have fevers, has had 1-2 bouts of emesis, continues to have good bowel movements; tavares montoya, does report that his abdomen appears more bloated than it was prior to disease process in the last
 couple of weeks; however, he continues to have no pain.  Had PICC lines successfully placed in left 
brachial vein, and successful ultrasound of lower extremities without DVT finding, found to have infl
uenza on major viral panel, subculture in progress on liver aspirate.

 

PHYSICAL EXAMINATION:

VITAL SIGNS:  Temperature 101.2, pulse 70, respiratory rate of 18, oxygen saturation 98% on room air,
 blood pressure of 128/82.

GENERAL:  The patient is alert and oriented, no acute distress.

HEENT:  Normocephalic, atraumatic.  Extraocular movements are intact.  Sclerae are clear.

HEART:  Regular rate and rhythm.  No murmurs auscultated.

LUNGS:  Clear to auscultation bilaterally.  No rubs or wheezes.

ABDOMEN:  Protuberant, positive bowel sounds throughout, soft, nontender.

EXTREMITIES:  No pitting edema of lower extremities.

NEUROLOGIC:  Alert and oriented x3.  No focal deficits.

 

LABORATORY DATA:  White blood cell count improved to 8.2, hemoglobin 11, platelet count of 260, perce
nt neutrophils 65, bands less than 6.  Sodium of 138, potassium of 3.5, CO2 of 24, creatinine of 1.1,
 AST of 37, ALT of 48, alkaline phosphatase 38, albumin of 2.9.

 

ASSESSMENT AND PLAN:  Liver abscess, influenza, hypoalbuminemia.  The patient initiated on Tamiflu, c
hanged this into meropenem, requested case management to set up home health suspicion of IV antibioti
cs, awaiting culture read, that is a subculture process.  We will follow up with Dr. Loaiza' recommend
ations as we can do.  Continue to control the patient's nausea and gastroesophageal reflux disease wi
th p.r.n. medications.  Continuing Tylenol and Motrin for fevers, much improved white blood cell coun
t and blood pressure today.  Initiating protein shakes t.i.d.  Follow up dietary recommendations otmaggy velásquez.

## 2018-01-31 NOTE — SPC
SONOGRAPHIC GUIDED RIGHT UPPER EXTREMITY PICC PLACEMENT:

 

HISTORY:

Liver abscess.

 

FINDINGS:

After explaining the procedure and obtaining informed consent, the left upper extremity was prepped a
nd draped in the usual sterile fashion.  Sterile technique, buffered local anesthesia, sonographic gu
idance, and a 22 gauge needle were used to carefully access the left basilic vein.  Standard techniqu
e was then used to place the tip of a 5 Serbian single-lumen PICC so that the tip lies at the level of
 the right atrium.  The catheter was flushed and secured externally.  The patient tolerated the proce
dure well and was returned in unchanged condition.

 

IMPRESSION:

Technically successful right upper extremity peripherally inserted central catheter placement.  The c
atheter is now ready for use.

 

POS: [facility]

## 2018-01-31 NOTE — ULT
BILATERAL LOWER EXTREMITY VENOUS DOPPLER ULTRASOUND:

 

Date: 1-31-18 

 

Comparison: None. 

 

History: Pain, edema, swelling, cough, assess for DVT. 

 

Technique: Multiplanar grayscale sonographic imaging of the venous structures of bilateral lower extr
emities obtained with color flow and spectral analysis. 

 

FINDINGS: 

Bilateral common femoral veins, greater saphenous, profunda femoral veins, femoral veins, popliteal v
eins and posterior tibial veins are patent. Normal blood flow, augmentation, and compression noted wi
thin the deep venous system bilaterally. 

 

No evidence for DVT. 

 

IMPRESSION: 

No evidence for deep venous thrombosis of either lower extremity. 

 

POS: [facility]

## 2018-02-01 LAB
ALBUMIN SERPL BCG-MCNC: 3.1 G/DL (ref 3.5–5)
ALP SERPL-CCNC: 38 U/L (ref 40–150)
ALT SERPL W P-5'-P-CCNC: 77 U/L (ref 8–55)
ANION GAP SERPL CALC-SCNC: 12 MMOL/L (ref 10–20)
AST SERPL-CCNC: 54 U/L (ref 5–34)
BILIRUB SERPL-MCNC: 0.2 MG/DL (ref 0.2–1.2)
BUN SERPL-MCNC: 14 MG/DL (ref 8.9–20.6)
CALCIUM SERPL-MCNC: 8.8 MG/DL (ref 7.8–10.44)
CHLORIDE SERPL-SCNC: 106 MMOL/L (ref 98–107)
CO2 SERPL-SCNC: 29 MMOL/L (ref 22–29)
CREAT CL PREDICTED SERPL C-G-VRATE: 101 ML/MIN (ref 70–130)
GLOBULIN SER CALC-MCNC: 3.9 G/DL (ref 2.4–3.5)
GLUCOSE SERPL-MCNC: 95 MG/DL (ref 70–105)
HGB BLD-MCNC: 12.4 G/DL (ref 14–18)
MCH RBC QN AUTO: 30.9 PG (ref 27–31)
MCV RBC AUTO: 98 FL (ref 80–94)
MDIFF COMPLETE?: YES
PLATELET # BLD AUTO: 315 THOU/UL (ref 130–400)
POTASSIUM SERPL-SCNC: 3.9 MMOL/L (ref 3.5–5.1)
RBC # BLD AUTO: 4.02 MILL/UL (ref 4.7–6.1)
SODIUM SERPL-SCNC: 143 MMOL/L (ref 136–145)
WBC # BLD AUTO: 7.6 THOU/UL (ref 4.8–10.8)

## 2018-02-01 RX ADMIN — MEROPENEM SCH MLS: 1 INJECTION, POWDER, FOR SOLUTION INTRAVENOUS at 04:02

## 2018-02-01 RX ADMIN — MEROPENEM SCH MLS: 1 INJECTION, POWDER, FOR SOLUTION INTRAVENOUS at 12:09

## 2018-02-01 RX ADMIN — Medication PRN ML: at 04:02

## 2018-02-01 RX ADMIN — MEROPENEM SCH MLS: 1 INJECTION, POWDER, FOR SOLUTION INTRAVENOUS at 20:19

## 2018-02-01 NOTE — PRG
DATE OF SERVICE:  02/01/2018

 

SUBJECTIVE:  Still having some intermittent fever spikes, but feels better in 
general.  No vomiting, no dyspnea, no abdominal pain.

 

PHYSICAL EXAMINATION:

VITAL SIGNS:  T-max 101.6, /84, pulse 92, respirations 20.

GENERAL:  Awake, alert, oriented.

LUNGS:  Clear.

HEART:  S1, S2, regular rate.

ABDOMEN:  Soft, not distended.

 

LABORATORY DATA:  Labs are better with white cell count down to 7.6, hemoglobin 
12.4 and creatinine is up a little bit at 1.38, AST 54, ALT 77, alkaline 
phosphatase 38.  Cultures from the liver aspirate thus far negative.  
Respiratory PCR panel showed influenza B detected.

 

ASSESSMENT AND DISCUSSION:  Liver abscess associated with diverticulitis and 
associated with influenza B infection.

 

The patient to be discharged on IV Invanz 1 g/day, treatment plan is until the 
first week of March approximately or second week of March, followup CT scans.  
Drainage was not feasible due to the distance of the abscess cavity from the 
skin.

 

MTDD

## 2018-02-01 NOTE — PRG
DATE OF SERVICE:   02/01/2018

 

HISTORY OF PRESENT ILLNESS:  The patient is ambulatory, walking up and down the unit, getting coffee 
with mask on, to not spread flu.  He says he feels better every single day.  No abdomen pain.  Verbal
ized understanding regarding protein shakes to help boost up his albumin and his protuberant and bloa
ting of his abdomen likely secondary to some mild fluid collection.  He has no new complaints today.

 

OBJECTIVE:

VITAL SIGNS:  Temperature 99.5, pulse of 90, respiratory rate of 20, oxygen saturation 97% on room ai
r, blood pressure overnight 132/85, a.m. reading of 178/97.

GENERAL:  The patient is alert and oriented, no acute distress.

HEENT:  Normocephalic, atraumatic.  Extraocular movements are intact.  Sclerae are clear.  Oral mucos
a is moist.

NECK:  Supple, nontender.

HEART:  Regular rate and rhythm at the time of exam.  No murmurs auscultated.

LUNGS:  Clear to auscultation bilaterally.  No rubs or wheezes.

ABDOMEN:  Protuberant, positive bowel sounds throughout.  No rebound or guarding.

EXTREMITIES:  Lower extremities without cyanosis or edema.

NEUROLOGIC:  The patient is alert and oriented x3, no focal deficits.  Speech is normal.

 

LABORATORY DATA:  White blood cell count 7.6, hemoglobin 12.4, platelet count of 315, neutrophil perc
ent of 68.  Sodium 143, potassium 3.9, CO2 of 29, creatinine of 1.3, glucose of 95, calcium 8.8, tota
l bilirubin 0.2, AST 54, ALT of 77, alkaline phosphatase 38.  Albumin of 3.1.

 

ASSESSMENT AND PLAN:  Liver abscess, diverticulitis, acute kidney injury, transaminitis, hypoalbumine
naheed.  He is currently on meropenem t.i.d., now worsening of left shift on cell counts.  Liver aspirat
e on subculture likely potentially back on type of organism today.  Antibiotics and susceptibilities 
may take 1-2 more days.  We will await those culture results and Dr. Loaiaz's recommendations.  The monique parkinson was shown how to use the bedside pump for his PICC line placed in left brachial vein.  He has d
one successfully with nursing helped x2.  Case management was consulted to help set up home infusion 
antibiotics, he is awaiting a specific antibiotic and duration and infusion protocol.  He will be fol
lowed to Infectious Disease at this point in time.  We will continue to trend the patient's liver enz
ymes and renal function while inpatient, likely suffered acute kidney injury secondary to antibiotics
 such as vancomycin which are nephrotoxic.  These have been discontinued and the patient's antibiotic
 regimen has been simplified.  He may also be experiencing some of this secondary to clearing of infe
ctious processes.  Protein shakes have been ordered for patient's hypoalbuminemia present on admissio
n following rehydration and resolution of volume contraction.  The patient had not been eating for 2-
3 weeks, but they represent protein malnutrition at this point.

## 2018-02-02 VITALS — DIASTOLIC BLOOD PRESSURE: 89 MMHG | SYSTOLIC BLOOD PRESSURE: 139 MMHG | TEMPERATURE: 98.4 F

## 2018-02-02 LAB
ALBUMIN SERPL BCG-MCNC: 2.8 G/DL (ref 3.5–5)
ALP SERPL-CCNC: 32 U/L (ref 40–150)
ALT SERPL W P-5'-P-CCNC: 95 U/L (ref 8–55)
ANION GAP SERPL CALC-SCNC: 14 MMOL/L (ref 10–20)
AST SERPL-CCNC: 61 U/L (ref 5–34)
BILIRUB SERPL-MCNC: 0.3 MG/DL (ref 0.2–1.2)
BUN SERPL-MCNC: 14 MG/DL (ref 8.9–20.6)
CALCIUM SERPL-MCNC: 8.6 MG/DL (ref 7.8–10.44)
CHLORIDE SERPL-SCNC: 107 MMOL/L (ref 98–107)
CO2 SERPL-SCNC: 23 MMOL/L (ref 22–29)
CREAT CL PREDICTED SERPL C-G-VRATE: 108 ML/MIN (ref 70–130)
GLOBULIN SER CALC-MCNC: 3.6 G/DL (ref 2.4–3.5)
GLUCOSE SERPL-MCNC: 89 MG/DL (ref 70–105)
HGB BLD-MCNC: 12.4 G/DL (ref 14–18)
MCH RBC QN AUTO: 31.5 PG (ref 27–31)
MCV RBC AUTO: 97.3 FL (ref 80–94)
MDIFF COMPLETE?: YES
PLATELET # BLD AUTO: 260 THOU/UL (ref 130–400)
POTASSIUM SERPL-SCNC: 4 MMOL/L (ref 3.5–5.1)
RBC # BLD AUTO: 3.92 MILL/UL (ref 4.7–6.1)
SODIUM SERPL-SCNC: 140 MMOL/L (ref 136–145)
WBC # BLD AUTO: 6.6 THOU/UL (ref 4.8–10.8)

## 2018-02-02 RX ADMIN — Medication PRN ML: at 12:03

## 2018-02-02 RX ADMIN — MEROPENEM SCH MLS: 1 INJECTION, POWDER, FOR SOLUTION INTRAVENOUS at 04:15

## 2018-02-02 RX ADMIN — MEROPENEM SCH MLS: 1 INJECTION, POWDER, FOR SOLUTION INTRAVENOUS at 12:02

## 2018-02-05 NOTE — DIS
DATE OF ADMISSION:  01/28/2018 

 

DATE OF DISCHARGE:  02/02/2018

 

CHIEF COMPLAINT:  Recurrent fevers.

 

HISTORY OF PRESENT ILLNESS:   On admission the patient was treated for upper respiratory infection- l
rebecca symptoms with otitis media approximately 2 weeks prior to admission, fevers and symptoms, had  re
currence of fevers again with upper respiratory infection-like symptoms, tested negative for the flu,
 both times.  Was told if not improving to represent for evaluation and did so in the emergency depar
tment and was found to have elements of diverticulitis and liver abscess after his stool starting to 
have changes and he did not respond to oral antibiotics after 24-48 hours on an outpatient basis of c
iprofloxacin and Flagyl.

 

HOSPITAL COURSE/  Attempted aspiration of liver abscess was made by Interventional Radiology after br
ief consultation in the Emergency Department of Surgery followed by consultation with Dr. Flores follow
ed by Dr. Perdomo.  Minimal aspirate was able to be obtained.  No drainage was able to be achieved.  No
 drain placed secondary to centralized location and lack of long enough equipment to reach.  As the p
atient was responding well to IV antibiotics, more invasive measures were not undertaken.  The antibi
otics were titrated by Dr. Loaiza of Infectious Disease, deescalated from broad spectrum vancomycin an
d Zosyn and metronidazole to meropenem.  PICC line was placed in the left brachial  area with home he
alth IV antibiotics set up for Invanz on an outpatient basis for better compliance with once daily us
e.  The patient remained stable throughout. Liver enzymes slowly crept up in the hospitalization.  Wh
ite blood cell counts had normalized.  The patient intervally had sick contact of flu with family mem
bers and was diagnosed with the flu, was treated with Tamiflu during hospitalization.  Nearing hospit
al discharge the patient did achieve afebrile status greater than 24 hours with T-max prior to discha
rge of 100.2 in the last 24 hours.  The patient's blood pressure was labile during his diagnosis of i
nfluenza and treatment with IV antibiotics.  The patient has never been treated before on an outpatie
nt basis for hypertension.

 

DISCHARGE DIAGNOSES:  

1.  Diverticulitis.  

2.  Liver abscess.

3.  Dehydration.

4.  Acute kidney injury, resolved.

5.  Transaminitis.

6.  Hypertension.  

7.  Influenza. 

8.  Hypoalbuminemia.

 

DISCHARGE DIET:  Includes regular plus protein shakes 2 to 3 times a day.

 

DISCHARGE ACTIVITY:  As tolerated.  No contact sports recommended.

 

DISCHARGE CONDITION:  Good.

 

DISCHARGE FOLLOWUP:  Include with Dr. Loaiza approximately 1 month, home health with weekly labs, franklin yeung in 7-10 days Dr. Refugio Marie.

 

DISCHARGE INSTRUCTIONS:  The patient was discharged home with Home Health for IV PICC line care and a
ntibiotic administration, Invanz 1 gram q. 24 hours.

 

DISCHARGE MEDICATIONS:  Other discharge medications include Tamiflu 75 mg 1 tab p.o. b.i.d., Norvasc 
5 mg 1 tab p.o. daily, Tylenol 1000 mg p.o. q.6-8h. p.r.n. for fever, max 4000 per day.  

 

We will follow up on his initial lab work and follow up in the clinic.

## 2018-03-12 ENCOUNTER — HOSPITAL ENCOUNTER (OUTPATIENT)
Dept: HOSPITAL 92 - SCSCT | Age: 48
Discharge: HOME | End: 2018-03-12
Attending: FAMILY MEDICINE
Payer: COMMERCIAL

## 2018-03-12 DIAGNOSIS — K75.0: Primary | ICD-10-CM

## 2018-03-12 DIAGNOSIS — K57.30: ICD-10-CM

## 2018-03-12 PROCEDURE — 74177 CT ABD & PELVIS W/CONTRAST: CPT

## 2018-03-12 NOTE — CT
CT ABDOMEN AND PELVIS WITH ORAL AND IV CONTRAST:

 

Date:  03/12/18 

 

HISTORY:  

Liver abscess, history of diverticulitis. 

 

COMPARISON:   

01/28/18. 

 

FINDINGS:

The heterogeneous mass in the left lobe of the liver consistent with liver abscess is smaller, measur
ing 4.2 cm in largest dimension (7.2 cm largest dimension on the exam of 01/28/18). 

 

There is sigmoid diverticulosis with a 12.0 mm hypodense lesion in the posterior wall of the sigmoid 
colon consistent with a small intramural abscess. No free air, free fluid, or lymphadenopathy seen in
 the abdomen or pelvis. 

 

The spleen, pancreas, adrenal glands, and kidneys are normal. No calcified gallstones are seen. The s
mall bowel loops are not abnormally dilated. A normal appearing appendix is present. There is no evid
ence of aneurysmal dilatation of the abdominal aorta. There are bilateral fat-containing inguinal her
niae. Bony structures are unremarkable. 

 

IMPRESSION: 

1.  Interval reduction in size of the liver abscess since 01/28/18. 

2.  Small intramural sigmoid abscess. This was not seen on the previous study. 

 

 

POS: [facility]